# Patient Record
Sex: MALE | Race: WHITE | NOT HISPANIC OR LATINO | Employment: OTHER | URBAN - METROPOLITAN AREA
[De-identification: names, ages, dates, MRNs, and addresses within clinical notes are randomized per-mention and may not be internally consistent; named-entity substitution may affect disease eponyms.]

---

## 2023-10-11 ENCOUNTER — LAB (OUTPATIENT)
Dept: LAB | Facility: LAB | Age: 58
End: 2023-10-11
Payer: MEDICARE

## 2023-10-11 ENCOUNTER — OFFICE VISIT (OUTPATIENT)
Dept: PRIMARY CARE | Facility: CLINIC | Age: 58
End: 2023-10-11
Payer: MEDICARE

## 2023-10-11 VITALS
DIASTOLIC BLOOD PRESSURE: 76 MMHG | WEIGHT: 180 LBS | HEART RATE: 60 BPM | SYSTOLIC BLOOD PRESSURE: 140 MMHG | OXYGEN SATURATION: 98 % | BODY MASS INDEX: 26.2 KG/M2

## 2023-10-11 DIAGNOSIS — G82.20 PARAPLEGIA (MULTI): ICD-10-CM

## 2023-10-11 DIAGNOSIS — R31.9 HEMATURIA, UNSPECIFIED TYPE: Primary | ICD-10-CM

## 2023-10-11 DIAGNOSIS — Z12.5 SCREENING FOR PROSTATE CANCER: ICD-10-CM

## 2023-10-11 PROBLEM — F41.9 ANXIETY DISORDER: Status: ACTIVE | Noted: 2023-10-11

## 2023-10-11 PROBLEM — E55.9 VITAMIN D DEFICIENCY: Status: ACTIVE | Noted: 2023-10-11

## 2023-10-11 PROBLEM — K21.9 ACID REFLUX DISEASE: Status: ACTIVE | Noted: 2023-10-11

## 2023-10-11 PROBLEM — B19.20 HEPATITIS C VIRUS: Status: ACTIVE | Noted: 2023-10-11

## 2023-10-11 PROBLEM — F32.A DEPRESSION: Status: ACTIVE | Noted: 2023-10-11

## 2023-10-11 PROBLEM — E78.5 BORDERLINE HYPERLIPIDEMIA: Status: ACTIVE | Noted: 2023-10-11

## 2023-10-11 PROBLEM — N52.9 ERECTILE DYSFUNCTION: Status: ACTIVE | Noted: 2023-10-11

## 2023-10-11 LAB
POC APPEARANCE, URINE: CLEAR
POC BILIRUBIN, URINE: NEGATIVE
POC BLOOD, URINE: ABNORMAL
POC COLOR, URINE: YELLOW
POC GLUCOSE, URINE: NEGATIVE MG/DL
POC KETONES, URINE: NEGATIVE MG/DL
POC LEUKOCYTES, URINE: NEGATIVE
POC NITRITE,URINE: NEGATIVE
POC PH, URINE: 6.5 PH
POC PROTEIN, URINE: NEGATIVE MG/DL
POC SPECIFIC GRAVITY, URINE: 1.01
POC UROBILINOGEN, URINE: 0.2 EU/DL

## 2023-10-11 PROCEDURE — G0103 PSA SCREENING: HCPCS

## 2023-10-11 PROCEDURE — 88112 CYTOPATH CELL ENHANCE TECH: CPT | Performed by: PATHOLOGY

## 2023-10-11 PROCEDURE — 36415 COLL VENOUS BLD VENIPUNCTURE: CPT

## 2023-10-11 PROCEDURE — 88112 CYTOPATH CELL ENHANCE TECH: CPT

## 2023-10-11 PROCEDURE — 1036F TOBACCO NON-USER: CPT | Performed by: NURSE PRACTITIONER

## 2023-10-11 PROCEDURE — 87086 URINE CULTURE/COLONY COUNT: CPT

## 2023-10-11 PROCEDURE — 81003 URINALYSIS AUTO W/O SCOPE: CPT | Performed by: NURSE PRACTITIONER

## 2023-10-11 PROCEDURE — 81001 URINALYSIS AUTO W/SCOPE: CPT

## 2023-10-11 PROCEDURE — 99212 OFFICE O/P EST SF 10 MIN: CPT | Performed by: NURSE PRACTITIONER

## 2023-10-11 RX ORDER — ALPRAZOLAM 1 MG/1
1 TABLET ORAL 3 TIMES DAILY
COMMUNITY

## 2023-10-11 RX ORDER — MELOXICAM 7.5 MG/1
7.5 TABLET ORAL 2 TIMES DAILY
COMMUNITY

## 2023-10-11 RX ORDER — MULTIVITAMIN
TABLET ORAL
COMMUNITY

## 2023-10-11 RX ORDER — CYANOCOBALAMIN (VITAMIN B-12) 500 MCG
TABLET ORAL
COMMUNITY

## 2023-10-11 RX ORDER — TRAZODONE HYDROCHLORIDE 150 MG/1
150 TABLET ORAL 2 TIMES DAILY
COMMUNITY
Start: 2023-09-22

## 2023-10-11 RX ORDER — TIZANIDINE 4 MG/1
1 TABLET ORAL 3 TIMES DAILY
COMMUNITY
Start: 2023-08-09 | End: 2023-11-07

## 2023-10-11 RX ORDER — HYDROCODONE BITARTRATE AND ACETAMINOPHEN 10; 325 MG/1; MG/1
TABLET ORAL
COMMUNITY

## 2023-10-11 RX ORDER — TADALAFIL 10 MG/1
TABLET ORAL
COMMUNITY
Start: 2019-03-04

## 2023-10-11 RX ORDER — LIDOCAINE AND PRILOCAINE 25; 25 MG/G; MG/G
CREAM TOPICAL
COMMUNITY
Start: 2023-09-01

## 2023-10-11 RX ORDER — OMEPRAZOLE 20 MG/1
20 CAPSULE, DELAYED RELEASE ORAL
COMMUNITY

## 2023-10-11 NOTE — PROGRESS NOTES
Noe Cohen is a 58 y.o. male who presents today for a sick visit  LOV June 2021    Chief Complaint   Patient presents with    Blood in Urine     Pt. Says ended 3 days ago, went on for 4 days. Pt. Denies fever, back pain, Pt. Self caths.       Symptoms: BLOOD IN URINE   Onset 6 days ago that lasted for 4 days  No fevers, chills, back pain   Blood resolved as of 3 days ago   Pt denies any sexually intercourse x 3 yrs     Pt is paraplegic, self catheterizes and can void regularly   He voided his specimen in office today     Pt is vaping nicotine  Stopped smoking cigarettes @ 8 yrs ago     Pts cousin has/had prostate cancer   Will check PSA  Pt had BMP per [facility] in August---> normal     Review of Systems  All 13 systems were reviewed and are within normal limits except positive and pertinent negative responses which are noted below or in HPI.        Objective   Vitals:  /76   Pulse 60   Wt 81.6 kg (180 lb)   SpO2 98%   BMI 26.20 kg/m²     Office Visit on 10/11/2023   Component Date Value Ref Range Status    POC Color, Urine 10/11/2023 Yellow  Straw, Yellow, Light-Yellow Final    POC Appearance, Urine 10/11/2023 Clear  Clear Final    POC Specific Gravity, Urine 10/11/2023 1.010  1.005 - 1.035 Final    POC PH, Urine 10/11/2023 6.5  No Reference Range Established PH Final    POC Protein, Urine 10/11/2023 NEGATIVE  NEGATIVE, 30 (1+) mg/dl Final    POC Glucose, Urine 10/11/2023 NEGATIVE  NEGATIVE mg/dl Final    POC Blood, Urine 10/11/2023 TRACE-Intact (A)  NEGATIVE Final    POC Ketones, Urine 10/11/2023 NEGATIVE  NEGATIVE mg/dl Final    POC Bilirubin, Urine 10/11/2023 NEGATIVE  NEGATIVE Final    POC Urobilinogen, Urine 10/11/2023 0.2  0.2, 1.0 EU/DL Final    Poc Nitrate, Urine 10/11/2023 NEGATIVE  NEGATIVE Final    POC Leukocytes, Urine 10/11/2023 NEGATIVE  NEGATIVE Final           Physical Exam  Vitals reviewed.   Cardiovascular:      Pulses: Normal pulses.   Pulmonary:      Effort: Pulmonary effort is normal.    Musculoskeletal:      Comments: W/C dependant    Neurological:      Mental Status: He is alert.   Psychiatric:         Mood and Affect: Mood normal.         Assessment/Plan   Problem List Items Addressed This Visit    None  Visit Diagnoses         Codes    Hematuria, unspecified type    -  Primary R31.9    Relevant Orders    POCT UA Automated manually resulted (Completed)    Urinalysis with Reflex Microscopic    Urine Culture    Cytology, non-gynecologic    Screening for prostate cancer     Z12.5    Relevant Orders    PSA, Total and Free

## 2023-10-11 NOTE — PATIENT INSTRUCTIONS
Thank you for seeing me today.  It was a pleasure to see you again!    #HEMATURIA  IO UA--> trace blood  Sent for UA, Culture and Cytology  PSA today    RTC AS NEEDED

## 2023-10-12 LAB
APPEARANCE UR: ABNORMAL
BILIRUB UR STRIP.AUTO-MCNC: NEGATIVE MG/DL
COLOR UR: YELLOW
GLUCOSE UR STRIP.AUTO-MCNC: NEGATIVE MG/DL
KETONES UR STRIP.AUTO-MCNC: NEGATIVE MG/DL
LEUKOCYTE ESTERASE UR QL STRIP.AUTO: NEGATIVE
NITRITE UR QL STRIP.AUTO: NEGATIVE
PH UR STRIP.AUTO: 6 [PH]
PROT UR STRIP.AUTO-MCNC: NEGATIVE MG/DL
RBC # UR STRIP.AUTO: ABNORMAL /UL
RBC #/AREA URNS AUTO: ABNORMAL /HPF
SP GR UR STRIP.AUTO: 1.01
SQUAMOUS #/AREA URNS AUTO: ABNORMAL /HPF
UROBILINOGEN UR STRIP.AUTO-MCNC: <2 MG/DL
WBC #/AREA URNS AUTO: ABNORMAL /HPF

## 2023-10-13 LAB
BACTERIA UR CULT: NORMAL
LABORATORY COMMENT REPORT: NORMAL
LABORATORY COMMENT REPORT: NORMAL
PATH REPORT.FINAL DX SPEC: NORMAL
PATH REPORT.GROSS SPEC: NORMAL
PATH REPORT.RELEVANT HX SPEC: NORMAL
PATH REPORT.TOTAL CANCER: NORMAL
PSA FREE MFR SERPL: 33 %
PSA FREE SERPL-MCNC: 0.1 NG/ML
PSA SERPL IA-MCNC: 0.3 NG/ML (ref 0–4)

## 2023-10-20 ENCOUNTER — TELEPHONE (OUTPATIENT)
Dept: PRIMARY CARE | Facility: CLINIC | Age: 58
End: 2023-10-20
Payer: MEDICARE

## 2024-11-04 NOTE — PROGRESS NOTES
"Subjective   Chief Complaint   Patient presents with    Numbness     Noe is a 60 yo male presenting today with c/o \"arms going numb\" pain and loss of strength. Patient has tried stretching. This has been going on for about 8 months.     Medicare Annual Wellness Visit Subsequent       Patient ID: Noe Cohen is a 59 y.o. male who presents for Numbness (Noe is a 60 yo male presenting today with c/o \"arms going numb\" pain and loss of strength. Patient has tried stretching. This has been going on for about 8 months. ) and Medicare Annual Wellness Visit Subsequent.    HPI  Noe is a 60 yo paraplegic male presenting today via w/c with c/o \"both arms going numb\"     Dx: Central pain syndrome, idiopathic transverse myelitis, paraplegia     As soon as I enter the room pt states \"I am afraid I am headed for quadriplegia or that I  may have bone cancer\"  He states that he has been continuing to do his strengthening exercises for his upper body but it is so much more painful than previously.  And when he lies down at night his arms go numb  He denies any neck pain   He has not dropped anything and the numbness comes and goes      Pt does not see neurology  He states \"they fired me years ago b/c they can't do anything for me\"   Pt states when he was 17 he stopped being able to walk and he developed severe pain all over his body     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 Quit 02/20/2015.   Currently vapes nicotine and THC   Drug Use: THC (vapes)      Allergies   Allergen Reactions    Haloperidol Other     5-6 day manic mode    Lorazepam Other    Sumatriptan Other " "      Review of Systems  ROS was completed and all systems are negative with the exception of what was noted in the the HPI.       Objective   /78   Pulse 85   Ht 1.765 m (5' 9.5\")   SpO2 97%   BMI 26.20 kg/m²      Current Outpatient Medications   Medication Instructions    ALPRAZolam (XANAX) 1 mg, 3 times daily    cholecalciferol (Vitamin D-3) 10 MCG (400 UNIT) tablet Take by mouth.    HYDROcodone-acetaminophen (Norco)  mg tablet TAKE 2 TABLETS BY MOUTH THREE TIMES DAILY AS NEEDED FOR PAIN FOR UP TO 30 DAYS.    lidocaine-prilocaine (Emla) 2.5-2.5 % cream Apply topically.    meloxicam (MOBIC) 7.5 mg, 2 times daily    multivitamin (Daily Multi-Vitamin) tablet Take by mouth.    omeprazole (PRILOSEC) 20 mg, Daily RT    tadalafil (Cialis) 10 mg tablet TAKE ONE-HALF TABLET BY MOUTH DAILY AS NEEDED ONE HOUR BEFORE NEEDED.    tiZANidine (Zanaflex) 4 mg tablet 1 tablet, oral, 3 times daily    traZODone (DESYREL) 150 mg, 2 times daily         Physical Exam  Vitals reviewed.   Cardiovascular:      Pulses: Normal pulses.   Pulmonary:      Effort: Pulmonary effort is normal.   Neurological:      Mental Status: He is alert and oriented to person, place, and time.   Psychiatric:         Mood and Affect: Mood is depressed. Affect is blunt and flat.         Assessment & Plan  Medicare annual wellness visit, subsequent  - Counseled on healthy diet and regular exercise  - Fall avoidance information provided  - Personalized prevention plan provided   - Vaccines; pt requests to get vaccines at pharmacy        Bilateral arm numbness and tingling while sleeping  CT C-spine to assess for lesions/masses   Orders:    CT cervical spine wo IV contrast; Future    Idiopathic transverse myelitis (Multi)  Condition stable based on symptoms and exam.    Continue current medications and follow-up at least yearly.  Tx per pain mgmt at [facility]        Central pain syndrome  Tx per pain mgmt at [facility]        Routine general medical examination " at health care facility    Orders:    1 Year Follow Up In Primary Care - Wellness Exam; Future

## 2024-11-05 ENCOUNTER — APPOINTMENT (OUTPATIENT)
Dept: PRIMARY CARE | Facility: CLINIC | Age: 59
End: 2024-11-05
Payer: MEDICARE

## 2024-11-05 VITALS
OXYGEN SATURATION: 97 % | SYSTOLIC BLOOD PRESSURE: 130 MMHG | BODY MASS INDEX: 26.2 KG/M2 | HEART RATE: 85 BPM | DIASTOLIC BLOOD PRESSURE: 78 MMHG | HEIGHT: 70 IN

## 2024-11-05 DIAGNOSIS — G89.0 CENTRAL PAIN SYNDROME: ICD-10-CM

## 2024-11-05 DIAGNOSIS — R20.2 BILATERAL ARM NUMBNESS AND TINGLING WHILE SLEEPING: ICD-10-CM

## 2024-11-05 DIAGNOSIS — Z00.00 MEDICARE ANNUAL WELLNESS VISIT, SUBSEQUENT: Primary | ICD-10-CM

## 2024-11-05 DIAGNOSIS — G37.3: ICD-10-CM

## 2024-11-05 DIAGNOSIS — R20.0 BILATERAL ARM NUMBNESS AND TINGLING WHILE SLEEPING: ICD-10-CM

## 2024-11-05 DIAGNOSIS — Z00.00 ROUTINE GENERAL MEDICAL EXAMINATION AT HEALTH CARE FACILITY: ICD-10-CM

## 2024-11-05 PROCEDURE — 99213 OFFICE O/P EST LOW 20 MIN: CPT | Performed by: NURSE PRACTITIONER

## 2024-11-05 PROCEDURE — 1036F TOBACCO NON-USER: CPT | Performed by: NURSE PRACTITIONER

## 2024-11-05 PROCEDURE — G0439 PPPS, SUBSEQ VISIT: HCPCS | Performed by: NURSE PRACTITIONER

## 2024-11-05 ASSESSMENT — PATIENT HEALTH QUESTIONNAIRE - PHQ9
SUM OF ALL RESPONSES TO PHQ9 QUESTIONS 1 AND 2: 2
10. IF YOU CHECKED OFF ANY PROBLEMS, HOW DIFFICULT HAVE THESE PROBLEMS MADE IT FOR YOU TO DO YOUR WORK, TAKE CARE OF THINGS AT HOME, OR GET ALONG WITH OTHER PEOPLE: SOMEWHAT DIFFICULT
2. FEELING DOWN, DEPRESSED OR HOPELESS: SEVERAL DAYS
1. LITTLE INTEREST OR PLEASURE IN DOING THINGS: SEVERAL DAYS

## 2024-11-05 ASSESSMENT — ACTIVITIES OF DAILY LIVING (ADL)
BATHING: INDEPENDENT
DOING_HOUSEWORK: NEEDS ASSISTANCE
GROCERY_SHOPPING: NEEDS ASSISTANCE
DRESSING: NEEDS ASSISTANCE
MANAGING_FINANCES: INDEPENDENT
TAKING_MEDICATION: INDEPENDENT

## 2024-11-05 ASSESSMENT — ENCOUNTER SYMPTOMS
LOSS OF SENSATION IN FEET: 1
DEPRESSION: 1
OCCASIONAL FEELINGS OF UNSTEADINESS: 1

## 2024-11-05 NOTE — ASSESSMENT & PLAN NOTE
Condition stable based on symptoms and exam.    Continue current medications and follow-up at least yearly.  Tx per pain mgmt at [facility]

## 2024-11-05 NOTE — ASSESSMENT & PLAN NOTE
- Counseled on healthy diet and regular exercise  - Fall avoidance information provided  - Personalized prevention plan provided   - Vaccines; pt requests to get vaccines at pharmacy

## 2024-11-05 NOTE — PATIENT INSTRUCTIONS
Thank you for seeing me today Noe Cohen, it was a pleasure to see you again!    Schedule CT C-spine to assess for any masses/lesions    Continue treatment per pain     For assistance with scheduling referrals or consultations, please call [phone] or [phone].    For laboratory, radiology, and other tests, please call [phone] ([phone] for pediatrics).   If you do not get results within 7-10 days, or you have any questions or concerns, please send a message, call the office ([phone]), or return to the office for a follow-up appointment.     For acute/sick visits, if you are unable to get an office visit, you can do a  On Demand Virtual Visit that is accessible via your My Chart account.  For emergencies, call 9-1-1 or go to the nearest Emergency Department.     Please schedule additional appointment(s) to address concern(s) not addressed today.    Please review prescription inserts and published information for possible adverse effects of all medications.     In general, results are discussed over the phone or via  [employer].     You can see your health information, review clinical summaries from office visits & test results online when you follow your health with MY  Chart, a personal health record.   To sign up go to [email or web address].   If you need assistance with signing up or trouble getting into your account call [employer] Patient Line 24/7 at [phone]     RTC AS NEEDED     Have a nice day!  Maida Valles

## 2024-11-20 ENCOUNTER — APPOINTMENT (OUTPATIENT)
Dept: RADIOLOGY | Facility: HOSPITAL | Age: 59
End: 2024-11-20
Payer: MEDICARE

## 2025-02-12 ENCOUNTER — HOSPITAL ENCOUNTER (OUTPATIENT)
Dept: RADIOLOGY | Facility: HOSPITAL | Age: 60
Discharge: HOME | End: 2025-02-12
Payer: MEDICARE

## 2025-02-12 DIAGNOSIS — Z92.89 PERSONAL HISTORY OF OTHER MEDICAL TREATMENT: ICD-10-CM

## 2025-02-12 PROCEDURE — 72100 X-RAY EXAM L-S SPINE 2/3 VWS: CPT
